# Patient Record
(demographics unavailable — no encounter records)

---

## 2024-11-22 NOTE — ASSESSMENT
[FreeTextEntry1] : 29 year old female presents for evaluation of daily Headaches with paresthesias and dizziness for past 10 days.  Patient has no prior headache history.  No inciting factors.   Given new onset without prior history, would confirm no intracranial pathology such as CSVT or aneurysmal, especially given the symptoms of lightheadedness, and intermittent focal paresthesias.   PLAN: - MRI Brain w/wo - MRA head - Medrol dose pack to break HA cycle - Zofran prn - If no improvement in headache, advised to start Topiramate 25mg qhs, increase by 25mg every week to reach 100mg  f/u in 4-6 weeks.

## 2024-11-22 NOTE — PHYSICAL EXAM
[FreeTextEntry1] :   General: Cooperative, NAD HEENT: NC/AT, no carotid bruits Lungs: CTAB Chest: RRR, no murmurs Extremities: nontender, no erythema Neurological Examination: NIHSS: 1 MS: AOx3. Appropriately interactive, normal affect. Speech fluent w/o paraphasic errors CN: PERLL, EOMI, V1-3 sensation intact, face symmetric, hearing intact, palate elevates symmetrically, tongue midline, SCM equal bilaterally Motor: normal bulk and tone, no tremor, rigidity or bradykinesia.  5/5 all over Sens: Decreased sensation in the left face by 50% to pinprick, vibration, and light touch Reflexes: 2/4 all over, downgoing toes b/l Coord:  No dysmetria, SILVINO intact Gait: Normal

## 2024-11-22 NOTE — HISTORY OF PRESENT ILLNESS
[FreeTextEntry1] :  [facility] AT [place]  CC: Dizziness HPI: 29 year old female presents for evaluation of Headaches, paresthesias.    Went to [facility] ED on 11/14 with dizziness x 2 days.  There was an associated headache, nausea, and intermittent left facial numbness.  HA was R retro-orbital, severe pain 8/10, throbbing. + phonophobia.  Dizziness was described as lightheaded, worse when standing or walking. Denied any fevers, chills, neck pain, vision changes. No upper or lower extremity weakness.  CT head negative. labs unremarkable. patient reported feeling better with fluids and meds.   Since the hospital, still having headaches, now bitemporal. and having bifacial paresthesias. +Nausea, +phonophobia. Occasional paresthesias in the left arm and both legs. Has tried ibuprofren gives relief but headache recurs.   Denies any prior headache history.  Had IUD placed in March. Had some spotting few weeks prior, but no other inciting factors.  No recent infections.